# Patient Record
Sex: FEMALE | Race: BLACK OR AFRICAN AMERICAN | NOT HISPANIC OR LATINO
[De-identification: names, ages, dates, MRNs, and addresses within clinical notes are randomized per-mention and may not be internally consistent; named-entity substitution may affect disease eponyms.]

---

## 2019-07-25 PROBLEM — Z00.00 ENCOUNTER FOR PREVENTIVE HEALTH EXAMINATION: Status: ACTIVE | Noted: 2019-07-25

## 2019-08-09 ENCOUNTER — APPOINTMENT (OUTPATIENT)
Dept: ORTHOPEDIC SURGERY | Facility: CLINIC | Age: 69
End: 2019-08-09
Payer: MEDICARE

## 2019-08-09 DIAGNOSIS — Z86.79 PERSONAL HISTORY OF OTHER DISEASES OF THE CIRCULATORY SYSTEM: ICD-10-CM

## 2019-08-09 DIAGNOSIS — F17.200 NICOTINE DEPENDENCE, UNSPECIFIED, UNCOMPLICATED: ICD-10-CM

## 2019-08-09 DIAGNOSIS — Z87.39 PERSONAL HISTORY OF OTHER DISEASES OF THE MUSCULOSKELETAL SYSTEM AND CONNECTIVE TISSUE: ICD-10-CM

## 2019-08-09 DIAGNOSIS — M25.561 PAIN IN RIGHT KNEE: ICD-10-CM

## 2019-08-09 DIAGNOSIS — Z96.652 PRESENCE OF LEFT ARTIFICIAL KNEE JOINT: ICD-10-CM

## 2019-08-09 DIAGNOSIS — Z80.9 FAMILY HISTORY OF MALIGNANT NEOPLASM, UNSPECIFIED: ICD-10-CM

## 2019-08-09 DIAGNOSIS — Z86.39 PERSONAL HISTORY OF OTHER ENDOCRINE, NUTRITIONAL AND METABOLIC DISEASE: ICD-10-CM

## 2019-08-09 DIAGNOSIS — Z86.69 PERSONAL HISTORY OF OTHER DISEASES OF THE NERVOUS SYSTEM AND SENSE ORGANS: ICD-10-CM

## 2019-08-09 PROCEDURE — 99203 OFFICE O/P NEW LOW 30 MIN: CPT

## 2019-08-09 PROCEDURE — 73564 X-RAY EXAM KNEE 4 OR MORE: CPT | Mod: 50

## 2019-08-09 RX ORDER — SIMVASTATIN 20 MG/1
20 TABLET, FILM COATED ORAL
Refills: 0 | Status: ACTIVE | COMMUNITY

## 2019-08-09 RX ORDER — INSULIN GLARGINE 100 [IU]/ML
100 INJECTION, SOLUTION SUBCUTANEOUS
Refills: 0 | Status: ACTIVE | COMMUNITY

## 2019-08-09 RX ORDER — CAPTOPRIL 25 MG/1
25 TABLET ORAL
Refills: 0 | Status: ACTIVE | COMMUNITY

## 2019-08-09 RX ORDER — SIMVASTATIN 5 MG/1
5 TABLET, FILM COATED ORAL
Refills: 0 | Status: ACTIVE | COMMUNITY

## 2019-08-09 RX ORDER — HYDROCHLOROTHIAZIDE 50 MG/1
50 TABLET ORAL
Refills: 0 | Status: ACTIVE | COMMUNITY

## 2019-08-09 RX ORDER — IBUPROFEN 800 MG/1
800 TABLET, FILM COATED ORAL
Refills: 0 | Status: ACTIVE | COMMUNITY

## 2019-08-09 NOTE — HISTORY OF PRESENT ILLNESS
[Pain Location] : pain [] : right knee [Worsening] : worsening [10] : a maximum pain level of 10/10 [Walking] : walking [Standing] : standing [Constant] : ~He/She~ states the symptoms seem to be constant [de-identified] : 69 year old female s/p left total knee replacement in 2015 with another orthopedic surgeon presents to the office today complaining of right knee pain. She reports doing well with her left knee pain, though she admits she did not do much physical therapy and she is limited in her ROM. Pt denies any pain in her left knee. However, in regard to the right knee she reports pain that is achy and sharp at times. She denies any swelling, but does report buckling, clicking, and loss of motion. She reports having to ambulate with a walker. Her ambulation is extremely limited due to her severely arthritic spine and pain in her right knee. She denies ever negotiating stairs due to the right knee pain. She reports taking ibuprofen 800 mg for pain with only minimal relief. Pt also reports having an injection in her right knee a few years back with only minimal relief for about one week. PT is here today to discuss her surgical options for a right knee replacement.

## 2019-08-09 NOTE — REVIEW OF SYSTEMS
[Negative] : Heme/Lymph [Joint Stiffness] : joint stiffness [Joint Pain] : joint pain [FreeTextEntry9] : Right knee

## 2019-08-09 NOTE — ASSESSMENT
[FreeTextEntry1] : Pt is here for evaluation of her arthritic right knee which is giving her significant pain and disability. She has also been told by a spine surgeon that she would benefit from PT and epidural injections on her LS spine. She is in the middle of a cardiac workup. We would like to see her back after cardiac work up is completed, being that she is interested in having an elective RTK replacement. Being that her right hip exam was abnormal we would also like to do xray of the right hip upon her return. She has also been counseled on the benefits of weight loss and smoking cessation. F/u in 2-4 weeks.

## 2019-08-09 NOTE — PHYSICAL EXAM
[de-identified] : General appearance: well nourished and hydrated, pleasant, alert and oriented x 3, cooperative.\par Cardiovascular: no apparent abnormalities, no lower leg edema, no varicosities, pedal pulses are palpable.\par Neurologic: sensation is normal, no muscle weakness in upper or lower extremities\par Dermatologic no apparent skin lesions, moist, warm, no rash.\par Gait: nonantalgic.\par \par Right knee\par Inspection: no effusion or erythema.\par Wounds: none.\par Alignment: 20 degree flexion contracture \par Palpation: medial joint line tenderness\par ROM: 20-90 degrees\par Ligamentous laxity: all ligaments appear stable\par Meniscal Test: n/a\par Muscle Test: good quad strength.\par \par Left knee\par Inspection: no effusion\par Wounds: healed midline incision\par Alignment: normal.\par Palpation: no specific tenderness on palpation.\par ROM: 0-90 degrees \par Muscle Test: good quad strength.\par Leg examination: calf is soft and non-tender.\par \par Left hip\par Inspection: No swelling or ecchymosis.\par Wounds: none.\par Palpation: non-tender.\par Stability: no instability.\par Strength: 5/5 all motor groups.\par ROM: full painless ROM\par Leg length: equal.\par \par Right hip\par Inspection: No swelling or ecchymosis.\par Wounds: none.\par Palpation: non-tender.\par Stability: no instability.\par Strength: 5/5 all motor groups.\par ROM: flexion to 80 degrees, with restrictive internal and external rotation. \par Leg length: equal.\par  [de-identified] : Radiographs done today AP lateral and skyline views of both knees shows a well aligned cemented PS TKA on the left. The right knee has complete loss of medial joint space, sclerosis and osteophyte formation and severe varus deformity.

## 2020-03-05 ENCOUNTER — OUTPATIENT (OUTPATIENT)
Dept: OUTPATIENT SERVICES | Facility: HOSPITAL | Age: 70
LOS: 1 days | Discharge: HOME | End: 2020-03-05

## 2020-03-05 ENCOUNTER — APPOINTMENT (OUTPATIENT)
Dept: BURN CARE | Facility: CLINIC | Age: 70
End: 2020-03-05
Payer: MEDICARE

## 2020-03-05 DIAGNOSIS — T25.021A BURN OF UNSPECIFIED DEGREE OF RIGHT FOOT, INITIAL ENCOUNTER: ICD-10-CM

## 2020-03-05 PROCEDURE — 99202 OFFICE O/P NEW SF 15 MIN: CPT

## 2020-03-05 RX ORDER — SILVER SULFADIAZINE 10 MG/G
1 CREAM TOPICAL TWICE DAILY
Qty: 1 | Refills: 2 | Status: ACTIVE | COMMUNITY
Start: 2020-03-05 | End: 1900-01-01

## 2020-03-12 ENCOUNTER — OUTPATIENT (OUTPATIENT)
Dept: OUTPATIENT SERVICES | Facility: HOSPITAL | Age: 70
LOS: 1 days | Discharge: HOME | End: 2020-03-12

## 2020-03-12 ENCOUNTER — APPOINTMENT (OUTPATIENT)
Dept: BURN CARE | Facility: CLINIC | Age: 70
End: 2020-03-12
Payer: MEDICARE
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
## 2020-03-12 DIAGNOSIS — T31.0 BURNS INVOLVING LESS THAN 10% OF BODY SURFACE: ICD-10-CM
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
## 2020-03-12 DIAGNOSIS — T25.021A BURN OF UNSPECIFIED DEGREE OF RIGHT FOOT, INITIAL ENCOUNTER: ICD-10-CM

## 2020-03-12 PROCEDURE — 16025 DRESS/DEBRID P-THICK BURN M: CPT

## 2020-03-12 PROCEDURE — 99213 OFFICE O/P EST LOW 20 MIN: CPT | Mod: 25
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
## 2020-03-18 DIAGNOSIS — T25.021A BURN OF UNSPECIFIED DEGREE OF RIGHT FOOT, INITIAL ENCOUNTER: ICD-10-CM
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
## 2021-04-02 ENCOUNTER — APPOINTMENT (OUTPATIENT)
Dept: OPHTHALMOLOGY | Facility: CLINIC | Age: 71
End: 2021-04-02
Payer: MEDICARE

## 2021-04-02 ENCOUNTER — NON-APPOINTMENT (OUTPATIENT)
Age: 71
End: 2021-04-02

## 2021-04-02 PROCEDURE — 92002 INTRM OPH EXAM NEW PATIENT: CPT

## 2021-04-19 ENCOUNTER — NON-APPOINTMENT (OUTPATIENT)
Age: 71
End: 2021-04-19

## 2021-04-19 ENCOUNTER — APPOINTMENT (OUTPATIENT)
Dept: OPHTHALMOLOGY | Facility: CLINIC | Age: 71
End: 2021-04-19
Payer: MEDICARE

## 2021-04-19 PROCEDURE — 92012 INTRM OPH EXAM EST PATIENT: CPT | Mod: 25

## 2021-04-19 PROCEDURE — 68761 CLOSE TEAR DUCT OPENING: CPT | Mod: E2,E4

## 2021-08-16 ENCOUNTER — APPOINTMENT (OUTPATIENT)
Dept: OPHTHALMOLOGY | Facility: CLINIC | Age: 71
End: 2021-08-16
Payer: MEDICARE

## 2021-08-16 ENCOUNTER — NON-APPOINTMENT (OUTPATIENT)
Age: 71
End: 2021-08-16

## 2021-08-16 PROCEDURE — 92012 INTRM OPH EXAM EST PATIENT: CPT | Mod: 25

## 2021-08-16 PROCEDURE — 68761 CLOSE TEAR DUCT OPENING: CPT | Mod: E2,E4

## 2021-10-01 ENCOUNTER — APPOINTMENT (OUTPATIENT)
Dept: OPHTHALMOLOGY | Facility: CLINIC | Age: 71
End: 2021-10-01

## 2021-11-29 ENCOUNTER — APPOINTMENT (OUTPATIENT)
Dept: OPHTHALMOLOGY | Facility: CLINIC | Age: 71
End: 2021-11-29
Payer: MEDICARE

## 2021-11-29 ENCOUNTER — NON-APPOINTMENT (OUTPATIENT)
Age: 71
End: 2021-11-29

## 2021-11-29 PROCEDURE — 68761 CLOSE TEAR DUCT OPENING: CPT | Mod: E2,E4

## 2021-11-29 PROCEDURE — 92014 COMPRE OPH EXAM EST PT 1/>: CPT | Mod: 25

## 2021-11-29 PROCEDURE — 92134 CPTRZ OPH DX IMG PST SGM RTA: CPT

## 2022-02-08 ENCOUNTER — APPOINTMENT (OUTPATIENT)
Dept: NEUROSURGERY | Facility: CLINIC | Age: 72
End: 2022-02-08
Payer: MEDICARE

## 2022-02-08 DIAGNOSIS — Z86.19 PERSONAL HISTORY OF OTHER INFECTIOUS AND PARASITIC DISEASES: ICD-10-CM

## 2022-02-08 PROCEDURE — 99204 OFFICE O/P NEW MOD 45 MIN: CPT

## 2022-02-08 NOTE — HISTORY OF PRESENT ILLNESS
[de-identified] : Ms. THIBODEAUX has a history of chronic lower back pain which she was seen and evaluated by Dr. Vazquez in 2020. She now presents for evaluation of cervical spine symptoms. Unfortunately, she tripped and fell 2 weeks ago. She hit her head on a metal office chair. After the fall she felt a "stinging" / burning sensation in her arms. This lasted a few hours and has resolved. She denied acute LE symptoms after the fall. Since then she has been having some neck pain with restriction in ROM. She has chronic restriction in ROM of the left shoulder. She also has trigger fingers in her left hand. She sees Dr. Mead for this and is in the process of scheduling a release. She ambulates with a rollator walker. No bowel / bladder dysfunction.\par \par On exam today, she has restriction and weakness with left external rotation and abduction. Hand  are intact. + Left hand trigger finger. Reflexes are brisk. LE strength intact. Sensory intact. Hoffmans negative. She ambulates with a slight kyphosis, + rollator walker. \par \par I have reviewed an Xray [facility] 2019, [facility]. She is found to have diffuse cervical spondylosis with anterior osteophyte formation. She had a CT head after her fall on 1/11/22, [facility] which was negative for acute pathology, + small vessel ischemic change, and hypertrophy of the transverse ligament along the posterior margin of the dens. \par

## 2022-02-08 NOTE — ASSESSMENT
[FreeTextEntry1] : I am concerned that Ms. THIBODEAUX had a central cord injury after her fall. Although her UE symptoms have improved I would like an MRI and CT cervical spine for further evaluation. She is agreeable. Her PCP also recommended a neurologist consult for stroke evaluation. I have referred her to a general neurologist today. I will see her back once the MRI / CT are completed. All questions answered today.\par

## 2022-03-03 ENCOUNTER — APPOINTMENT (OUTPATIENT)
Dept: NEUROSURGERY | Facility: CLINIC | Age: 72
End: 2022-03-03
Payer: MEDICARE

## 2022-03-03 VITALS — BODY MASS INDEX: 44.2 KG/M2 | WEIGHT: 275 LBS | HEIGHT: 66 IN

## 2022-03-03 PROCEDURE — 99214 OFFICE O/P EST MOD 30 MIN: CPT

## 2022-03-08 NOTE — ASSESSMENT
[FreeTextEntry1] : We have had a thorough discussion regarding her current condition and findings. Ms. THIBODEAUX is aware of her MRI and CT scan findings. I would like her to see Dr. Hauser next week to discuss treatment options. She is agreeable and will bring her sister to that visit with her. All questions answered today.\par

## 2022-03-08 NOTE — HISTORY OF PRESENT ILLNESS
[FreeTextEntry1] :  Ms. THIBODEAUX has a history of chronic lower back pain which she was seen and evaluated by Dr. Vazquez in 2020. \par \par Unfortunately, she tripped and fell 6 weeks ago. She hit her head on a metal office chair. After the fall she felt a "stinging" / burning sensation in her arms. This lasted a few hours and has resolved. She denied acute LE symptoms after the fall. \par \par Although her acute symptoms have resolved since the fall, she states she is back to her baseline neck pain with restriction in ROM of the cervical spine and left shoulder. She ambulates with a rollator walker. No bowel / bladder dysfunction.\par \par On exam today, she has restriction and weakness with left external rotation and abduction. Hand  are intact. + Left hand trigger finger. Reflexes are brisk. LE strength intact. Sensory intact. Hoffmans negative. She ambulates with a slight kyphosis, + rollator walker. \par \par Today, we have reviewed an MRI and CT scan of the cervical spine from [facility], 2/2022. She is found to have diffuse cervical spondylosis with an osteophytic disc complex at C3/4 causing severe stenosis with cord signal change. + DISH anteriorly C2-C7. \par \par Upon further discussion she does report difficulty swallowing certain foods and medications. She did see an ENT last year and work up was negative. She recently had an EGD with her GI specialist who diagnosed her with GERD.

## 2022-03-14 ENCOUNTER — APPOINTMENT (OUTPATIENT)
Dept: NEUROSURGERY | Facility: CLINIC | Age: 72
End: 2022-03-14
Payer: MEDICARE

## 2022-03-14 PROCEDURE — 99213 OFFICE O/P EST LOW 20 MIN: CPT

## 2022-03-15 NOTE — HISTORY OF PRESENT ILLNESS
[FreeTextEntry1] : Patient returns to discuss cervical spine.  Briefly, severe DISH C2-5, canal stenosis at C3-4 from disc.  She has neck pain and paresthesias.  She reports difficulty swallowing. ambulates with a walker.  Feels her condition is getting worse, failed conservative management.\par \par She is obese, diabetic, although controlled.\par \par I believe she is a poor surgical candidate.  However, discussed with her that if surgery were entertained, I would consider C2-5 osteophyte removal, C3-4 ACDF, consider posterior cervical instrumentation and fusion.  She is high risk.  I encouraged her to hold off on surgery as long as possible.\par \par She will follow-up with cardiologist, pulminologist, and return in mid April.

## 2022-03-18 ENCOUNTER — NON-APPOINTMENT (OUTPATIENT)
Age: 72
End: 2022-03-18

## 2022-03-18 ENCOUNTER — APPOINTMENT (OUTPATIENT)
Dept: OPHTHALMOLOGY | Facility: CLINIC | Age: 72
End: 2022-03-18
Payer: MEDICARE

## 2022-03-18 PROCEDURE — 92012 INTRM OPH EXAM EST PATIENT: CPT | Mod: 25

## 2022-03-18 PROCEDURE — 68761 CLOSE TEAR DUCT OPENING: CPT | Mod: E2,E4,E3,E1

## 2022-04-13 ENCOUNTER — APPOINTMENT (OUTPATIENT)
Dept: NEUROSURGERY | Facility: CLINIC | Age: 72
End: 2022-04-13
Payer: MEDICARE

## 2022-04-13 VITALS — HEIGHT: 66 IN | WEIGHT: 275 LBS | BODY MASS INDEX: 44.2 KG/M2

## 2022-04-13 PROCEDURE — 99212 OFFICE O/P EST SF 10 MIN: CPT

## 2022-04-14 NOTE — HISTORY OF PRESENT ILLNESS
[FreeTextEntry1] : Briefly, Ms. Goncalves has severe DISH C2-5, canal stenosis at C3-4 from disc. \par \par Today she is here in follow up accompanied by her sister to discuss surgery. \eber Continues to have difficulty swallowing pills only.\par Has chronic neck pain, paresthesia in her fingers, and gait disturbances.\par \par She saw Dr. Reginald Birch, pulmonologist, diagnosis with borderline COPD, she has cut down from smoking a pack to 9 to 10 cigarettes a day, she is considering the patch.\par She is schedule to see Dr. Jason Osman, cardiologist, next week to discuss ECHO results.\par

## 2022-08-24 ENCOUNTER — APPOINTMENT (OUTPATIENT)
Dept: NEUROSURGERY | Facility: CLINIC | Age: 72
End: 2022-08-24

## 2022-08-24 VITALS — WEIGHT: 275 LBS | BODY MASS INDEX: 44.2 KG/M2 | HEIGHT: 66 IN

## 2022-08-24 PROCEDURE — 99212 OFFICE O/P EST SF 10 MIN: CPT

## 2022-08-24 NOTE — HISTORY OF PRESENT ILLNESS
[FreeTextEntry1] : CC:  72 year-old female with DISH presents today with progressive difficulty swallowing.  She reports no new pain or neurologic dysfunction.\par \par xrays shows potentially worsening of anterior osteophytes.\par \par neuro exam stable

## 2022-09-26 ENCOUNTER — OUTPATIENT (OUTPATIENT)
Dept: OUTPATIENT SERVICES | Facility: HOSPITAL | Age: 72
LOS: 1 days | Discharge: HOME | End: 2022-09-26

## 2022-09-26 ENCOUNTER — RESULT REVIEW (OUTPATIENT)
Age: 72
End: 2022-09-26

## 2022-09-26 DIAGNOSIS — M48.10 ANKYLOSING HYPEROSTOSIS [FORESTIER], SITE UNSPECIFIED: ICD-10-CM

## 2022-09-26 DIAGNOSIS — R13.10 DYSPHAGIA, UNSPECIFIED: ICD-10-CM

## 2022-09-26 PROCEDURE — 74230 X-RAY XM SWLNG FUNCJ C+: CPT | Mod: 26

## 2022-11-09 ENCOUNTER — APPOINTMENT (OUTPATIENT)
Dept: NEUROSURGERY | Facility: CLINIC | Age: 72
End: 2022-11-09

## 2022-11-09 VITALS — WEIGHT: 270 LBS | HEIGHT: 66 IN | BODY MASS INDEX: 43.39 KG/M2

## 2022-11-09 PROCEDURE — 99211 OFF/OP EST MAY X REQ PHY/QHP: CPT

## 2022-11-10 NOTE — HISTORY OF PRESENT ILLNESS
[FreeTextEntry1] : swallowing complaints stable\par cervical myelopathy stable\par recommend cervical xray in 6 months from last and follow-up\par patient high risk for surgery, will try to avoid

## 2022-12-19 ENCOUNTER — APPOINTMENT (OUTPATIENT)
Dept: OPHTHALMOLOGY | Facility: CLINIC | Age: 72
End: 2022-12-19

## 2022-12-19 ENCOUNTER — NON-APPOINTMENT (OUTPATIENT)
Age: 72
End: 2022-12-19

## 2022-12-19 PROCEDURE — 92014 COMPRE OPH EXAM EST PT 1/>: CPT | Mod: 25

## 2022-12-19 PROCEDURE — 68761 CLOSE TEAR DUCT OPENING: CPT | Mod: E2,E3,E4,E1

## 2023-01-19 ENCOUNTER — APPOINTMENT (OUTPATIENT)
Dept: NEUROSURGERY | Facility: CLINIC | Age: 73
End: 2023-01-19
Payer: MEDICARE

## 2023-01-19 PROCEDURE — 99441: CPT | Mod: 95

## 2023-01-20 NOTE — REASON FOR VISIT
[FreeTextEntry1] : complains of new numbness\par  diabetic\par cervical stenosis\par EMG\par f/u with PCP for blood work\par f/u in 4 weeks

## 2023-02-27 ENCOUNTER — APPOINTMENT (OUTPATIENT)
Dept: NEUROSURGERY | Facility: CLINIC | Age: 73
End: 2023-02-27

## 2023-04-17 ENCOUNTER — APPOINTMENT (OUTPATIENT)
Dept: OPHTHALMOLOGY | Facility: CLINIC | Age: 73
End: 2023-04-17
Payer: MEDICARE

## 2023-04-17 ENCOUNTER — NON-APPOINTMENT (OUTPATIENT)
Age: 73
End: 2023-04-17

## 2023-04-17 PROCEDURE — 92014 COMPRE OPH EXAM EST PT 1/>: CPT | Mod: 25

## 2023-04-17 PROCEDURE — 68761 CLOSE TEAR DUCT OPENING: CPT | Mod: E1,E2,E3,E4

## 2023-04-17 PROCEDURE — 92250 FUNDUS PHOTOGRAPHY W/I&R: CPT

## 2023-04-20 ENCOUNTER — APPOINTMENT (OUTPATIENT)
Dept: NEUROSURGERY | Facility: CLINIC | Age: 73
End: 2023-04-20
Payer: MEDICARE

## 2023-04-20 PROCEDURE — 99212 OFFICE O/P EST SF 10 MIN: CPT | Mod: 95

## 2023-04-22 NOTE — REASON FOR VISIT
[FreeTextEntry1] : bilateral hand pain and numbness\par EMG consistent with carpal tunnel syndrome\par wrist splints\par OT\par pain management for injections

## 2023-05-19 ENCOUNTER — NON-APPOINTMENT (OUTPATIENT)
Age: 73
End: 2023-05-19

## 2023-05-19 ENCOUNTER — APPOINTMENT (OUTPATIENT)
Dept: OPHTHALMOLOGY | Facility: CLINIC | Age: 73
End: 2023-05-19
Payer: MEDICARE

## 2023-05-19 PROCEDURE — 92014 COMPRE OPH EXAM EST PT 1/>: CPT

## 2023-05-19 PROCEDURE — 92250 FUNDUS PHOTOGRAPHY W/I&R: CPT

## 2023-05-24 ENCOUNTER — APPOINTMENT (OUTPATIENT)
Dept: PAIN MANAGEMENT | Facility: CLINIC | Age: 73
End: 2023-05-24
Payer: MEDICARE

## 2023-05-24 VITALS — BODY MASS INDEX: 44.36 KG/M2 | WEIGHT: 276 LBS | HEIGHT: 66 IN

## 2023-05-24 DIAGNOSIS — M17.11 UNILATERAL PRIMARY OSTEOARTHRITIS, RIGHT KNEE: ICD-10-CM

## 2023-05-24 PROCEDURE — 99204 OFFICE O/P NEW MOD 45 MIN: CPT

## 2023-05-24 RX ORDER — GABAPENTIN 600 MG/1
600 TABLET, COATED ORAL 3 TIMES DAILY
Qty: 90 | Refills: 0 | Status: ACTIVE | COMMUNITY

## 2023-05-25 NOTE — DATA REVIEWED
[FreeTextEntry1] : EMG report reviewed\par Axonal pathology affecting c6,7,8 nerve roots bilaterally\par carpal tunnel syndrome moderate on the right / severe on left\par

## 2023-05-25 NOTE — DISCUSSION/SUMMARY
[de-identified] : A discussion regarding available pain management treatment options occurred with the patient.  These included interventional, rehabilitative, pharmacological, and alternative modalities. We will proceed with the following:\par \par Interventional treatment options:\par -  Will proceed with a BL carpal tunnel injection under ultrasound \par \par Rehabilitative options:\par - initiate trial of physical therapy for right knee \par \par Medication based treatment options:\par - Ordered Meloxicam to take one a day for two weeks, patient was advised to stop taking Naproxen\par We decided to start an anti-inflammatory medication. We are starting Meloxicam 15mg. We have discussed the risks, benefits, and alternatives NSAID therapy including but not limited to the risk of bleeding, thrombosis, gastric mucosal irritation/ulceration, allergic reaction and kidney dysfunction; the patient verbalizes an understanding.\par \par - Ordered Gabapentin to take 3 x day \par Potential side effects were discussed which included dizziness, sedation, and pedal edema to name a few. If the patient cannot tolerate these side effects should they occur, then they will stop the medication. If the patient experiences sedation, they understand that they should not drive. The usage of the medication was discussed and the patient understands that it is an anti-epileptic medication used for neuropathic pain and that the pain relief from this medication will likely be subtle, and that hopefully in combination with the other treatments we are offering we will be able to get some additive relief.\par \par Complementary treatment options:\par - lifestyle modifications discussed\par \par JAMES Kinney attest that this documentation has been prepared under the direction and in the presence of provider Dr. Bautista Lomas. \par The documentation recorded by the scribe in my presence, accurately reflects the service I personally performed, and the decisions made by me with my edits as appropriate. \par \par Bautista Lomas, DO\par \par

## 2023-05-25 NOTE — PHYSICAL EXAM
[de-identified] : Lumbar Spine Exam:\par Inspection:\par erythema (-)\par ecchymosis (-)\par rashes (-)\par alignment: no scoliosis\par \par Palpation:\par Midline lumbar tenderness:             (-)\par paraspinal tenderness:                  L (-) ; R (-)\par sciatic nerve tenderness :             L (-) ; R (-)\par SI joint tenderness:                        L (-) ; R (-)\par GTB tenderness:                            L (-);  R (-)\par \par Limited ROM 2/2 to pain\par \par Strength:\par 5/5 throughout all muscle groups of the lower extremity\par \par                                    Right       Left   \par Hip Flexion:                (5/5)       (5/5)\par Quadriceps:               (5/5)       (5/5)\par Hamstrings:                (5/5)       (5/5)\par Ankle Dorsiflexion:     (5/5)       (5/5)\par EHL:                           (5/5)       (5/5)\par Ankle Plantarflexion:  (5/5)       (5/5)\par \par Special Tests:\par SLR:                           R (-) ; L (-)\par Facet loading:            R (-) ; L (-)\par CLAUDIA test:               R (-) ; L (-)\par \par Neurologic:\par Light touch intact throughout LE \par Reflexes normal and symmetric \par \par Gait:\par non- antalgic gait\par ambulates w/o assistive device\par

## 2023-05-25 NOTE — HISTORY OF PRESENT ILLNESS
[FreeTextEntry1] : HISTORY OF PRESENT ILLNESS: Mrs. Goncalves is a 73-year-old female with a chief complaint of bilateral wrist and hand pain. THe pain is associated with numbness/tingling in the thenar eminence along with the first four digits on both hands. EMG supports both a moderate to severe Carpal tunnel syndrome in addition to sub acute axonal pathology affecting C6,7,8 nerve roots on both sides.\par \par The patient has history for DISH / cervical cord syndrome. \eber Cohn also suffers with low back pain with radicular symptoms there as well. SHe reports having diffuse bilateral arm and leg pain associated with swelling. \par \par Today, she complains mainly of the bilateral hand pain and numbness, which affects her daily activities. SHe also complains of right knee pain that is very severe.\par \par As for her hand pain - strength is reduced and overall ability to carry out her normal daily functions is lessened. She reports pain that is aching/throbbing with intermittent periods of sharp stabbing pains. \par \par As for her right knee pain - pain is moderate when sitting, and severe when standing/walking. She saw an orthopedic surgeon - Dr. Wise who imaged the knee and reported severe OA, but did not offer surgery. We are requesting the imaging from his office. \par \par Pain is located at the medial and lateral knee joint and is sharp, stabbing with underlying throbbing pains. Pain is associated with swelling especially toward the end of the day. Nothing helps the pain despite trying otc meds, ice, heat and other conservative measures. Pain is 9/10.\par \par . low back pain with secondary pain in her left wrist and right leg pain. The patient reports this pain has gone on for years in her low back. The patient reports of history of arthritis and DISH disease. She reports 2 months ago, she woke up one morning and bilateral knees and ankles that is associated with swollen and achy. She states he right knee is worse, her left knee has been replaced. She obtained an EMG with Dr. Hauser due to bilateral numbness and tingling to hands and tips of fingers that has been going on for 4 years. She states she has weakness in hands and can’t hold on to any objections without falling out of hands.Patient describes the pain as severe. During the last month the pain has been constant with symptoms worsening in no typical pattern. Pain described as burning, sharp, cramping, dull aching, throbbing, shooting.  Pain is associated with numbness/pins and needles into the right and left upper and lower extremities. Patient has weakness in the right and left upper and lower extremities. Pain is increased with standing, walking, and exercise. Pain is decreased with lying down, sitting, and relaxation.  Pain is not changed with coughing sneezing and bowel movements. Bowel or bladder habits have not changed.\par \par She has managed this pain with Gabapentin 600mg 1 in the morning and 1 at night and she states she took Naproxen up to 4 x a day due to severe pain.\par \par ACTIVITIES: Patient could walk less than a block before the pain starts.  Patient has no pain when sitting.  Patient can stand for a couple minutes before pain starts.  The patient often lies down because of pain.  Patient uses cane and walker at this time.  Patient has difficulty performing household chores, doing yardwork or shopping, socializing with friends, participating in recreational activities or exercise at this time.\par Prior Pain Medications: Tylenol, naproxen, gabapentin \par

## 2023-06-07 ENCOUNTER — APPOINTMENT (OUTPATIENT)
Dept: PAIN MANAGEMENT | Facility: CLINIC | Age: 73
End: 2023-06-07
Payer: MEDICARE

## 2023-06-07 PROCEDURE — 20526 THER INJECTION CARP TUNNEL: CPT | Mod: 50

## 2023-06-11 NOTE — PROCEDURE
[FreeTextEntry3] : Date of Service: 06/07/2023 \par \par Account: [id number]\par \par Patient: BROOKLYNN THIBODEAUX \par \par YOB: 1950\par \par Age: 73 year\par \par Surgeon:      Bautista Lomas DO\par \par Assistant:    None\par \par Pre-Operative Diagnosis:         Carpal tunnel syndrome\par \par Post Operative Diagnosis:       Carpal tunnel syndrome \par \par Procedure:             carpal tunnel steroid injection \par \par Anesthesia:            none\par \par This procedure was carried out using ultrasound guidance.  The risks and benefits of the procedure were discussed extensively with the patient.  The consent of the patient was obtained and the following procedure was performed. The patient was placed in the prone position on the fluoroscopy table and the area was prepped and draped in a sterile fashion.  A timeout was performed with all essential staff present and the site and side were verified.\par \par The patient was placed in the sitting position with a sterile sheet under her hands.  The wrist area was prepped and draped in a sterile fashion.  Under ultrasound guidance, the flexor retinaculum and carpal tunnel was identified and marked.  Using sterile technique the superficial skin was anesthetized with 1% Lidocaine.  A 25 gauge Tuohy needle was advanced into the carpal tunnel, piercing the flexor retinaculum.  After negative aspiration for heme, 0.5cc (5mg decadron) was injected.  \par \par The exact same process was completed for the right hand. \par \par Disposition:\par \par      1. The patient was advised to F/U in 1-2 weeks to assess the response to the injection.\par      2. The patient was also instructed to contact me immediately if there were any concerns related to the procedure performed. 
Calm

## 2023-06-20 ENCOUNTER — APPOINTMENT (OUTPATIENT)
Dept: OPHTHALMOLOGY | Facility: CLINIC | Age: 73
End: 2023-06-20

## 2023-07-05 ENCOUNTER — APPOINTMENT (OUTPATIENT)
Dept: PAIN MANAGEMENT | Facility: CLINIC | Age: 73
End: 2023-07-05

## 2023-08-03 ENCOUNTER — APPOINTMENT (OUTPATIENT)
Dept: OPHTHALMOLOGY | Facility: CLINIC | Age: 73
End: 2023-08-03

## 2023-11-09 ENCOUNTER — APPOINTMENT (OUTPATIENT)
Dept: NEUROSURGERY | Facility: CLINIC | Age: 73
End: 2023-11-09
Payer: MEDICARE

## 2023-11-09 VITALS — WEIGHT: 276 LBS | HEIGHT: 66 IN | BODY MASS INDEX: 44.36 KG/M2

## 2023-11-09 PROCEDURE — 99211 OFF/OP EST MAY X REQ PHY/QHP: CPT

## 2023-11-10 ENCOUNTER — APPOINTMENT (OUTPATIENT)
Dept: OPHTHALMOLOGY | Facility: CLINIC | Age: 73
End: 2023-11-10

## 2023-12-14 ENCOUNTER — APPOINTMENT (OUTPATIENT)
Dept: NEUROSURGERY | Facility: CLINIC | Age: 73
End: 2023-12-14
Payer: MEDICARE

## 2023-12-14 VITALS — WEIGHT: 276 LBS | BODY MASS INDEX: 44.36 KG/M2 | HEIGHT: 66 IN

## 2023-12-14 DIAGNOSIS — S14.129D CENTRAL CORD SYNDROME AT UNSPECIFIED LVL OF CERVICAL SPINAL CORD, SUBSEQUENT ENCOUNTER: ICD-10-CM

## 2023-12-14 PROCEDURE — 99214 OFFICE O/P EST MOD 30 MIN: CPT

## 2023-12-14 NOTE — ASSESSMENT
[FreeTextEntry1] : This is a 74 yo F who presents for neurosurgical revisit with regards to severe central and neuroforaminal cervical spinal stenosis, most notable at C3-4 and C6-7. Patient exhibits correlating physical exam findings and we have discussed the possibility for neurosurgical intervention. Ultimately, she has a plethora of underlying comorbidities, including active tobacco use and DM, which raises her risk for surgical complication.  We have outlined that she can consider a SLIME X1 and she wishes to consult with Dr. Tucker. I will facilitate the appropriate consultation and with notable caution as to the volume of steroid injected considering her stenosis at the C6-7 segment.   She will also begin to adopt lifestyle modifications including cessation of tobacco use along with tight diabetic control.  I will revisit with her in 4 weeks and she is to contact me with any questions or concerns in the interim.  I personally reviewed with the PA, this patient's history and physical exam findings, as documented above. I have discussed the relevant areas of concern, having direct implications to the presenting problems and illnesses, and I have personally examined all pertinent and positive and negative findings, which impact the neurosurgical treatment plan.  COREY Park MD

## 2023-12-14 NOTE — HISTORY OF PRESENT ILLNESS
[FreeTextEntry1] : Ms. THIBODEAUX is a 74 yo F who presents for neurosurgical revisit with regards to persistent cervical radicular pain complaints. Symptoms have intensified over the previous month with a marked increase in both isolated neck pain along with radiating pain predominantly into the right upper extremity. Typically, she experiences numbness, tingling, shooting pain from the cervical region into the right upper extremity settling into the 1-2 digits of the right hand consistent with a C6/7 dermatomal distribution. She notes fine manipulation deficits and overall functional decline as of late.  Ambulation remains limited and she warrants a rolling walker at times for assistance.   [facility]- [facility]- study reveals evidence of severe DISH with advanced degenerative disease diffusely. Central and biforaminal stenosis noted to a severe degree at C3-4 and C6-7.  PHYSICAL EXAM:   Constitutional: Well appearing, no distress HEENT: Normocephalic Atraumatic Psychiatric: Alert and oriented to all spheres, normal mood Pulmonary: No respiratory distress  Neurologic:  CN II-XII grossly intact Palpation: (+) cervical paravertebral tenderness Strength:  strength, right, 4/5.  Sensation: Full sensation to light touch in all extremities Reflexes:   2+ biceps  2+ triceps   No Oneil's  No clonus  ROM markedly limited in all movements  Gait: unsteady, walking w/ assistance.

## 2024-01-10 ENCOUNTER — APPOINTMENT (OUTPATIENT)
Dept: PAIN MANAGEMENT | Facility: CLINIC | Age: 74
End: 2024-01-10
Payer: MEDICARE

## 2024-01-10 ENCOUNTER — APPOINTMENT (OUTPATIENT)
Dept: NEUROSURGERY | Facility: CLINIC | Age: 74
End: 2024-01-10

## 2024-01-10 VITALS — WEIGHT: 276 LBS | BODY MASS INDEX: 44.36 KG/M2 | HEIGHT: 66 IN

## 2024-01-10 DIAGNOSIS — M47.812 SPONDYLOSIS W/OUT MYELOPATHY OR RADICULOPATHY, CERVICAL REGION: ICD-10-CM

## 2024-01-10 PROCEDURE — 99215 OFFICE O/P EST HI 40 MIN: CPT

## 2024-01-10 NOTE — DATA REVIEWED
[FreeTextEntry1] : EMG report reviewed Axonal pathology affecting c6,7,8 nerve roots bilaterally carpal tunnel syndrome moderate on the right / severe on left  MRI of the cervical spine dated 12/6/2023 demonstrates multilevel degenerative changes in the cervical spine as outlined in the body of the report overall not significantly changed from prior MRI dated 2/13/2022.  Prominent retrodental pannus indents the ventral cord with moderate to severe spinal canal narrowing at craniocervical junction.  Severe spinal canal narrowing with flattening of the ventral cord at C3-4.  Moderate spinal canal narrowing at C6-7.  Mild spinal canal narrowing at C2-3, C4-5 and C5-6.  Multilevel high-grade foraminal narrowing as described, most severe at the left C5-6 level.  CT of the cervical spine dated 2/22/2022 demonstrates central/left paracentral disc/osteophyte complex at C3-4 which mildly flattens the ventral cord at the left of midline unchanged.  Mild bilateral neural foraminal narrowing is also reidentified at this level.  Moderate left neural foraminal narrowing again noted at C2-3.  Mild bilateral neural foraminal narrowing again seen at C4-5.  Mild disc bulging and marked left neural foraminal narrowing at C5-6, unchanged.  Diffuse disc bulging/ridging reidentified at C6-7 producing marked anterior thecal sac effacement without cord deformity.  Marked left and mild right neural foraminal narrowing is also again seen at this level.  UDS: No data obtained today  [place] [facility]: Checked.

## 2024-01-10 NOTE — ASSESSMENT
[FreeTextEntry1] : This is a 73-year-old female with complaints of neck pain with radicular features into the right upper extremity. The pain travels down into the hand and is associated with numbness/tingling. The pain is severe and makes it difficult for her to perform her ADLs.  Imaging studies as well as physical examination findings corroborate symptomatology. We will proceed with a SLIME x1 w/ mac and she will follow up afterwards. In the interim, she would like to trial physical therapy for symptom control. All this patients questions were answered and the conversation was understood well.  Patient had a MRI that shows a radicular component along with pain referred into the upper extremity. Patient has trialed rehab (Home exercise, physical therapy or chiropractic care) and medications. I will schedule a cervical epidural steroid 1-3 depending on effectiveness.  The patient has severe anxiety of procedures that necessitates monitored anesthesia care (MAC). The procedure performed will be close to major nerves, arteries, and spinal cord and/or joint structures. Due to the proximity of these structures, we need the patient to be still during the procedure. With the help of MAC, this will be safely achieved and decrease the risk of any complications.  RISK AND BENEFIT PARAGRAPH: Risk, benefits, pros and cons of procedure were explained to the patient using models and diagrams and their questions were answered.  I, Christa Link, attest that this documentation has been prepared under the direction and in the presence of Provider Loli Tucker MD.   Thank you for allowing me to assist in the management of this patient.    Best Regards,    Loli Tucker M.D., FAAPMR   Diplomate, American Board of Physical Medicine and Rehabilitation Diplomate, American Board of Pain Medicine  Diplomate, American Board of Pain Management

## 2024-01-10 NOTE — HISTORY OF PRESENT ILLNESS
[FreeTextEntry1] : ORIGINAL PRESENTATION:  Mrs. Goncalves is a 73-year-old female with a chief complaint of bilateral wrist and hand pain. The pain is associated with numbness/tingling in the thenar eminence along with the first four digits on both hands. EMG supports both a moderate to severe Carpal tunnel syndrome in addition to subacute axonal pathology affecting C6,7,8 nerve roots on both sides. The patient also has history for DISH / cervical cord syndrome. She also suffers with low back pain with radicular symptoms there as well. She reports having diffuse bilateral arm and leg pain associated with swelling.   As for her hand pain - strength is reduced and overall ability to carry out her normal daily functions is lessened. She reports pain that is aching/throbbing with intermittent periods of sharp stabbing pains.   As for her right knee pain - pain is moderate when sitting, and severe when standing/walking. She saw an orthopedic surgeon - Dr. Wise who imaged the knee and reported severe OA, but did not offer surgery. We are requesting the imaging from his office.   Pain is located at the medial and lateral knee joint and is sharp, stabbing with underlying throbbing pains. Pain is associated with swelling especially toward the end of the day. Nothing helps the pain despite trying otc meds, ice, heat and other conservative measures. Pain is 9/10.  Patient describes the pain as severe. During the last month the pain has been constant with symptoms worsening in no typical pattern. Pain described as burning, sharp, cramping, dull aching, throbbing, shooting.  Pain is associated with numbness/pins and needles into the right and left upper and lower extremities. Patient has weakness in the right and left upper and lower extremities. Pain is increased with standing, walking, and exercise. Pain is decreased with lying down, sitting, and relaxation.  Pain is not changed with coughing sneezing and bowel movements. Bowel or bladder habits have not changed.  She has managed this pain with Gabapentin 600mg 1 in the morning and 1 at night and she states she took Naproxen up to 4 x a day due to severe pain.  ACTIVITIES: Patient could walk less than a block before the pain starts.  Patient has no pain when sitting.  Patient can stand for a couple minutes before pain starts.  The patient often lies down because of pain.  Patient uses cane and walker at this time.  Patient has difficulty performing household chores, doing yardwork or shopping, socializing with friends, participating in recreational activities or exercise at this time. Prior Pain Medications: Tylenol, naproxen, gabapentin   PATIENT PRESENTS FOR FOLLOW UP: This is a former Dr. Bautista Lomas patient transferring her care to me. The patient has history for DISH / cervical cord syndrome. She is referred back by neurosurgery for cervical pain and radiculopathy and to trial injection therapy. She experiences neck pain with radicular features into the right upper extremity. The pain travels down into the hand. She notes there is numbness in the right hand which makes it difficult for her to perform her ADLs. Patient has not trialed physical therapy in the past. Imaging was reviewed with the patient and is documented below. The option to trial injection therapy was discussed and she is agreeable.

## 2024-01-10 NOTE — REASON FOR VISIT
[Follow-Up Visit] : a follow-up pain management visit [FreeTextEntry2] : Evaluation back and leg pain

## 2024-01-10 NOTE — PHYSICAL EXAM
[de-identified] : Lumbar Spine Exam:\par  Inspection:\par  erythema (-)\par  ecchymosis (-)\par  rashes (-)\par  alignment: no scoliosis\par  \par  Palpation:\par  Midline lumbar tenderness:             (-)\par  paraspinal tenderness:                  L (-) ; R (-)\par  sciatic nerve tenderness :             L (-) ; R (-)\par  SI joint tenderness:                        L (-) ; R (-)\par  GTB tenderness:                            L (-);  R (-)\par  \par  Limited ROM 2/2 to pain\par  \par  Strength:\par  5/5 throughout all muscle groups of the lower extremity\par  \par                                     Right       Left   \par  Hip Flexion:                (5/5)       (5/5)\par  Quadriceps:               (5/5)       (5/5)\par  Hamstrings:                (5/5)       (5/5)\par  Ankle Dorsiflexion:     (5/5)       (5/5)\par  EHL:                           (5/5)       (5/5)\par  Ankle Plantarflexion:  (5/5)       (5/5)\par  \par  Special Tests:\par  SLR:                           R (-) ; L (-)\par  Facet loading:            R (-) ; L (-)\par  CLAUDIA test:               R (-) ; L (-)\par  \par  Neurologic:\par  Light touch intact throughout LE \par  Reflexes normal and symmetric \par  \par  Gait:\par  non- antalgic gait\par  ambulates w/o assistive device\par

## 2024-01-31 ENCOUNTER — APPOINTMENT (OUTPATIENT)
Dept: NEUROSURGERY | Facility: CLINIC | Age: 74
End: 2024-01-31

## 2024-02-16 ENCOUNTER — APPOINTMENT (OUTPATIENT)
Dept: PAIN MANAGEMENT | Facility: CLINIC | Age: 74
End: 2024-02-16
Payer: MEDICARE

## 2024-02-16 ENCOUNTER — APPOINTMENT (OUTPATIENT)
Dept: PAIN MANAGEMENT | Facility: CLINIC | Age: 74
End: 2024-02-16

## 2024-02-16 ENCOUNTER — APPOINTMENT (OUTPATIENT)
Dept: OPHTHALMOLOGY | Facility: CLINIC | Age: 74
End: 2024-02-16

## 2024-02-16 DIAGNOSIS — M54.12 RADICULOPATHY, CERVICAL REGION: ICD-10-CM

## 2024-02-16 PROCEDURE — 93770 DETERMINATION VENOUS PRESS: CPT

## 2024-02-16 PROCEDURE — 94761 N-INVAS EAR/PLS OXIMETRY MLT: CPT

## 2024-02-16 PROCEDURE — 00600 ANES PX CRV SPINE&CORD NOS: CPT | Mod: QZ,P2

## 2024-02-16 PROCEDURE — 62321 NJX INTERLAMINAR CRV/THRC: CPT

## 2024-02-16 PROCEDURE — 93040 RHYTHM ECG WITH REPORT: CPT | Mod: 79

## 2024-02-16 NOTE — PROCEDURE
[FreeTextEntry3] :  CERVICAL EPIDURAL STEROID INJECTION UNDER FLUORSCOPY    Date:  2024  Patient: Aye Goncalves  :  1950  Preoperative Diagnosis: Cervical radiculopathy  Postoperative Diagnosis: Cervical radiculopathy  Procedure: Translaminar Cervical Epidural Injection under fluoroscopy  Physician: Loli Tucker MD, FAAPMR  Anesthesiologist/CRNA: Ms. King  Anesthesia: See nurses note. MAC/Cold spray. Versed 2 mg, fentanyl 50 mcg IVP     Medical Necessity:  Failure of conservative management.     CONSENT: The possible complications including infection, bleeding, nerve damage, hospital admission, death or failure of the procedure; though unusual, are theoretically possible. The patient was educated about the of the procedure and alternative therapies. All questions were answered and the patient freely gave consent to proceed.     Indication for Fluoroscopy:  This procedure requires the precise placement of the spinal needle.  It is the only way to accurately and safely perform the injection.     Monitoring:  Patient had continuous blood pressure, EKG, and pulse oximetry throughout the case. See nurse's notes.     After obtaining written consent, the After obtaining written consent, the patient was positioned prone on the fluoroscopy table. The back to her neck and upper thorax was prepped with Betadine and draped in usual sterile fashion. A time out was performed. The C7-T1 interspace was identified using fluoroscopy. The skin was infiltrated with lidocaine 2% -- 1 cc for subcutaneous analgesia.  The epidural space was identified using a 17g touhy needle with a midline approach using a loss of resistance technique. 2cc omnipaque was used to define the space. A solution of 5 ml of preservative-free sterile saline and 1ml of Methylprednisolone 80mg, 1cc was infused with minimal pressure on the syringe into the epidural space. The procedure was tolerated well. There was no evidence of CSF, Paresthesias nor heme. The needle was removed intact. A band aid was place on the site.     Epidurogram:  No signs of epidural fibrosis.     Complications: None. The patient tolerated the procedure well.     Disposition: I have examined the patient and there are no new physical findings since the original presentation.  Sensory and motor function were intact. The patient met discharge criteria see nurses notes. The discharge instruction sheet was reviewed and given to the patient. The patient was discharged home with a .  If patient gets sustained relief will have patient do shoulder griddle strengthening with Thera bands and walking.     Comment: 1st SLIME today, schedule 2nd in 1-2 weeks depending of effectiveness vs follow up in office depending on the insurance. Call if any problems.     This document was electronically signed by:     Loli Tucker MD, FAAPMR Diplomate, American Board of Physical Medicine and Rehabilitation Diplomate, American Board of Pain Medicine

## 2024-04-15 ENCOUNTER — APPOINTMENT (OUTPATIENT)
Dept: NEUROSURGERY | Facility: CLINIC | Age: 74
End: 2024-04-15
Payer: MEDICARE

## 2024-04-15 VITALS — HEIGHT: 66 IN | WEIGHT: 276 LBS | BODY MASS INDEX: 44.36 KG/M2

## 2024-04-15 DIAGNOSIS — G56.03 CARPAL TUNNEL SYNDROM,BILATERAL UPPER LIMBS: ICD-10-CM

## 2024-04-15 DIAGNOSIS — M48.10 ANKYLOSING HYPEROSTOSIS [FORESTIER], SITE UNSPECIFIED: ICD-10-CM

## 2024-04-15 DIAGNOSIS — M47.22 OTHER SPONDYLOSIS WITH RADICULOPATHY, CERVICAL REGION: ICD-10-CM

## 2024-04-15 PROCEDURE — 99213 OFFICE O/P EST LOW 20 MIN: CPT

## 2024-04-15 RX ORDER — GABAPENTIN 600 MG/1
600 TABLET, COATED ORAL EVERY 6 HOURS
Qty: 120 | Refills: 1 | Status: ACTIVE | COMMUNITY
Start: 2024-04-15 | End: 1900-01-01

## 2024-04-15 NOTE — ASSESSMENT
[FreeTextEntry1] : We have had a thorough discussion regarding her current condition, findings, and treatment options. Ms. THIBODEAUX presents with progressive neck pain with radiculopathy / myelopathy. She has known cervical spondylosis with DISH and associated stenosis. She has a number of underlying comorbidities including diabetes, possible COPD, and active tobacco use which raises her risks of surgical complications.  Prior to considering surgical intervention she would like to maximize conservative treatments.  We will increase her gabapentin and renew Mobic to take as needed.  We briefly discussed the option of a posterior cervical decompression however in the interim she will work on smoking cessation.  She would notify me of any issues changes or concerns.  I personally reviewed with the PA, this patient's history and physical exam findings, as documented above. I have discussed the relevant areas of concern, having direct implications to the presenting problems and illnesses, and I have personally examined all pertinent and positive and negative findings, which impact their neurosurgical treatment.  MS EMIR Foss-C Senior Physician Assistant [facility]    Chanel Hauser MD FAANS Chair, Department of Neurosurgery [facility]

## 2024-04-15 NOTE — HISTORY OF PRESENT ILLNESS
[FreeTextEntry1] : Ms. THIBODEAUX presents today for neurosurgical follow-up.  She continues to experience persistent neck pain with associated radiculopathy and numbness and paresthesias in her hands.  She has a known history of DISH /diffuse degenerative disc disease of the cervical spine with associated stenosis.  -MRI cervical spine from 12/6/2023, regional: severe DISH with advanced degenerative disease diffusely. Central and biforaminal stenosis noted to a severe degree at C3-4 and C6-7.  -EMG: Bilateral carpal tunnel syndrome moderate to severe on the left and moderate on the right along with underlying cervical radiculopathy.  Since her last office visit she had a SLIME with pain management (Dr. Tucker) initially she reported an increase in pain however the exacerbation has decreased. She feels her pain and fine motor deficits have progressed.   Of note she has a past medical history of diabetes, hypertension, possible COPD given her chronic smoking history.  She has been trying to decrease her cigarette use.  She continues to smoke at least 7 cigarettes a day.  PHYSICAL EXAM: Constitutional: Well appearing, no distress, ambulating with a rollator walker. HEENT: Normocephalic Atraumatic Psychiatric: Alert and oriented to all spheres, normal mood Pulmonary: No respiratory distress Neurologic: CN II-XII grossly intact Palpation: (+) cervical paravertebral tenderness Strength:  strength, right, 4/5. Sensation: Full sensation to light touch in all extremities Reflexes: 2+ biceps 2+ triceps No Oneil's No clonus +Tinels ROM markedly limited in all movements Gait: unsteady, walking w/ assistance.

## 2024-05-14 RX ORDER — MELOXICAM 15 MG/1
15 TABLET ORAL DAILY
Qty: 14 | Refills: 0 | Status: COMPLETED | COMMUNITY
Start: 2023-05-24 | End: 2024-05-28

## 2024-06-03 RX ORDER — MELOXICAM 15 MG/1
15 TABLET ORAL
Qty: 30 | Refills: 1 | Status: ACTIVE | COMMUNITY
Start: 2024-06-03 | End: 1900-01-01

## 2024-08-13 RX ORDER — GABAPENTIN 800 MG/1
800 TABLET, COATED ORAL 3 TIMES DAILY
Qty: 90 | Refills: 2 | Status: ACTIVE | COMMUNITY
Start: 2024-08-13 | End: 1900-01-01

## 2024-08-19 ENCOUNTER — APPOINTMENT (OUTPATIENT)
Dept: OPHTHALMOLOGY | Facility: CLINIC | Age: 74
End: 2024-08-19
Payer: MEDICARE

## 2024-08-19 ENCOUNTER — NON-APPOINTMENT (OUTPATIENT)
Age: 74
End: 2024-08-19

## 2024-08-19 PROCEDURE — 92012 INTRM OPH EXAM EST PATIENT: CPT | Mod: 25

## 2024-08-19 PROCEDURE — 68761 CLOSE TEAR DUCT OPENING: CPT | Mod: E1,E2,E3,E4

## 2024-09-11 ENCOUNTER — APPOINTMENT (OUTPATIENT)
Dept: NEUROSURGERY | Facility: CLINIC | Age: 74
End: 2024-09-11
Payer: MEDICARE

## 2024-09-11 VITALS — BODY MASS INDEX: 44.36 KG/M2 | WEIGHT: 276 LBS | HEIGHT: 66 IN

## 2024-09-11 DIAGNOSIS — M47.22 OTHER SPONDYLOSIS WITH RADICULOPATHY, CERVICAL REGION: ICD-10-CM

## 2024-09-11 DIAGNOSIS — S14.129D CENTRAL CORD SYNDROME AT UNSPECIFIED LVL OF CERVICAL SPINAL CORD, SUBSEQUENT ENCOUNTER: ICD-10-CM

## 2024-09-11 DIAGNOSIS — M47.812 SPONDYLOSIS W/OUT MYELOPATHY OR RADICULOPATHY, CERVICAL REGION: ICD-10-CM

## 2024-09-11 DIAGNOSIS — M54.12 RADICULOPATHY, CERVICAL REGION: ICD-10-CM

## 2024-09-11 DIAGNOSIS — M48.10 ANKYLOSING HYPEROSTOSIS [FORESTIER], SITE UNSPECIFIED: ICD-10-CM

## 2024-09-11 PROCEDURE — 99213 OFFICE O/P EST LOW 20 MIN: CPT

## 2024-09-11 NOTE — ASSESSMENT
[FreeTextEntry1] : This is a 75 yo F who presents for neurosurgical revisit with regards to cervical radiculopathy affecting the right upper extremity in the setting of extensive DISH/ multilevel spinal stenosis. She has extensive underlying comorbidities and continues to smoke cigarettes which makes her a high risk surgical candidate.  We have had a thorough discussion regarding the risks and benefits of surgery at this time and ultimately she wishes to exhaust all interventional treatments through pain management.  I have provided her with a referral to Dr. Tucker for additional considerations and she will return to our office as needed moving forward.  COREY Park MD

## 2024-09-11 NOTE — HISTORY OF PRESENT ILLNESS
[FreeTextEntry1] : This is a 74-year-old female who presents for neurosurgical revisit with regards to chronic pain involving the cervical region.  Symptoms have remained fairly stable over the past several months.  She has undergone 1 epidural with Dr. Tucker which provided excellent sustained benefit with regards to her right upper extremity radiculopathy.  This procedure was conducted approximately 7 months prior and over the past 1 to 2 months she has slowly began to experience a return of her radicular symptoms.  Patient with continued fine manipulation deficits.  She continues to use a rolling walker for assistance.  MR C spine- Regional- 12/6/2023- study reveals evidence of severe DISH with advanced degenerative disease diffusely. Central and biforaminal stenosis noted to a severe degree at C3-4 and C6-7.  EXAM: Neurologic: CN II-XII grossly intact Palpation: (+) cervical paravertebral tenderness Strength:  strength, right, 4/5. Sensation: Full sensation to light touch in all extremities Reflexes: 2+ biceps 2+ triceps  No Oneil's No clonus  ROM markedly limited in all movements  Gait: unsteady, walking w/ assistance.

## 2024-09-19 ENCOUNTER — APPOINTMENT (OUTPATIENT)
Dept: OPHTHALMOLOGY | Facility: CLINIC | Age: 74
End: 2024-09-19

## 2024-09-19 ENCOUNTER — NON-APPOINTMENT (OUTPATIENT)
Age: 74
End: 2024-09-19

## 2024-09-19 PROCEDURE — 92014 COMPRE OPH EXAM EST PT 1/>: CPT

## 2024-09-19 PROCEDURE — 92134 CPTRZ OPH DX IMG PST SGM RTA: CPT

## 2024-10-02 ENCOUNTER — APPOINTMENT (OUTPATIENT)
Dept: PAIN MANAGEMENT | Facility: CLINIC | Age: 74
End: 2024-10-02
Payer: MEDICARE

## 2024-10-02 DIAGNOSIS — M47.22 OTHER SPONDYLOSIS WITH RADICULOPATHY, CERVICAL REGION: ICD-10-CM

## 2024-10-02 DIAGNOSIS — M54.12 RADICULOPATHY, CERVICAL REGION: ICD-10-CM

## 2024-10-02 DIAGNOSIS — M47.812 SPONDYLOSIS W/OUT MYELOPATHY OR RADICULOPATHY, CERVICAL REGION: ICD-10-CM

## 2024-10-02 DIAGNOSIS — M25.561 PAIN IN RIGHT KNEE: ICD-10-CM

## 2024-10-02 DIAGNOSIS — M48.10 ANKYLOSING HYPEROSTOSIS [FORESTIER], SITE UNSPECIFIED: ICD-10-CM

## 2024-10-02 DIAGNOSIS — S14.129D CENTRAL CORD SYNDROME AT UNSPECIFIED LVL OF CERVICAL SPINAL CORD, SUBSEQUENT ENCOUNTER: ICD-10-CM

## 2024-10-02 PROCEDURE — 99215 OFFICE O/P EST HI 40 MIN: CPT

## 2024-10-02 NOTE — PHYSICAL EXAM
[de-identified] : EXAM: Neurologic: CN II-XII grossly intact Palpation: (+) cervical paravertebral tenderness Strength:  strength, right, 4/5. Sensation: Full sensation to light touch in all extremities Reflexes: 2+ biceps 2+ triceps  No Oneil's No clonus  ROM markedly limited in all movements  Gait: unsteady, walking w/ assistance.

## 2024-10-02 NOTE — HISTORY OF PRESENT ILLNESS
[FreeTextEntry1] : ORIGINAL PRESENTATION:  Mrs. Goncalves is a 74-year-old female with a chief complaint of bilateral wrist and hand pain. The pain is associated with numbness/tingling in the thenar eminence along with the first four digits on both hands. EMG supports both a moderate to severe Carpal tunnel syndrome in addition to subacute axonal pathology affecting C6,7,8 nerve roots on both sides. The patient also has history for DISH / cervical cord syndrome. She also suffers with low back pain with radicular symptoms there as well. She reports having diffuse bilateral arm and leg pain associated with swelling.   As for her hand pain - strength is reduced and overall ability to carry out her normal daily functions is lessened. She reports pain that is aching/throbbing with intermittent periods of sharp stabbing pains.   As for her right knee pain - pain is moderate when sitting, and severe when standing/walking. She saw an orthopedic surgeon - Dr. Wise who imaged the knee and reported severe OA, but did not offer surgery. We are requesting the imaging from his office.   Pain is located at the medial and lateral knee joint and is sharp, stabbing with underlying throbbing pains. Pain is associated with swelling especially toward the end of the day. Nothing helps the pain despite trying otc meds, ice, heat and other conservative measures. Pain is 9/10.  Patient describes the pain as severe. During the last month the pain has been constant with symptoms worsening in no typical pattern. Pain described as burning, sharp, cramping, dull aching, throbbing, shooting.  Pain is associated with numbness/pins and needles into the right and left upper and lower extremities. Patient has weakness in the right and left upper and lower extremities. Pain is increased with standing, walking, and exercise. Pain is decreased with lying down, sitting, and relaxation.  Pain is not changed with coughing sneezing and bowel movements. Bowel or bladder habits have not changed.  She has managed this pain with Gabapentin 600mg 1 in the morning and 1 at night and she states she took Naproxen up to 4 x a day due to severe pain.  ACTIVITIES: Patient could walk less than a block before the pain starts.  Patient has no pain when sitting.  Patient can stand for a couple minutes before pain starts.  The patient often lies down because of pain.  Patient uses cane and walker at this time.  Patient has difficulty performing household chores, doing yardwork or shopping, socializing with friends, participating in recreational activities or exercise at this time. Prior Pain Medications: Tylenol, naproxen, gabapentin   TODAY: Patient presents for a revisit appointment. The patient has history for DISH / cervical cord syndrome. She experiences neck pain with radicular features into the right upper extremity. She underwent a SLIME with MAC in February 2024 with >50% relief until a few weeks ago. She states she is back to having neck pain traveling down her right arm into the hand. She notes there is numbness in the right hand which makes it difficult for her to perform her ADLs. She was evaluated by neurosurgery and advised she is a high risk surgical candidate. She was referred back to our office for a repeat SLIME with MAC.  Of note, the patient has right knee pain due to severe OA and has been undergoing R knee steroid injections with Dr. Girard. Her last injection was 3 months ago. She wishes to undergo knee injections at our office in the future. Prior to doing so, she will undergo a x-ray of the right knee.

## 2024-10-02 NOTE — ASSESSMENT
[FreeTextEntry1] : This is a 74-year-old female with complaints of neck pain with radicular features into the right upper extremity. She underwent a SLIME with MAC in February 2024 with >50% relief until a few weeks ago. She states she is back to having neck pain traveling down her right arm into the hand. She notes there is numbness in the right hand. We will proceed with a SLIME x1 w/ mac and she will follow up afterwards. With regard to her right knee, she will undergo a right knee x-ray.  Patient had a MRI that shows a radicular component along with pain referred into the upper extremity. Patient has trialed rehab (Home exercise, physical therapy or chiropractic care) and medications. I will schedule a cervical epidural steroid 1-3 depending on effectiveness.  The patient has severe anxiety of procedures that necessitates monitored anesthesia care (MAC). The procedure performed will be close to major nerves, arteries, and spinal cord and/or joint structures. Due to the proximity of these structures, we need the patient to be still during the procedure. With the help of MAC, this will be safely achieved and decrease the risk of any complications.  Risk, benefits, pros and cons of procedure were explained to the patient using models and diagrams and their questions were answered.  COREY Easley MD

## 2024-10-07 ENCOUNTER — APPOINTMENT (OUTPATIENT)
Dept: OPHTHALMOLOGY | Facility: CLINIC | Age: 74
End: 2024-10-07
Payer: MEDICARE

## 2024-10-07 ENCOUNTER — NON-APPOINTMENT (OUTPATIENT)
Age: 74
End: 2024-10-07

## 2024-10-07 PROCEDURE — 92012 INTRM OPH EXAM EST PATIENT: CPT

## 2024-10-22 ENCOUNTER — APPOINTMENT (OUTPATIENT)
Facility: CLINIC | Age: 74
End: 2024-10-22

## 2024-10-22 ENCOUNTER — APPOINTMENT (OUTPATIENT)
Dept: PAIN MANAGEMENT | Facility: CLINIC | Age: 74
End: 2024-10-22
Payer: MEDICARE

## 2024-10-22 DIAGNOSIS — M54.12 RADICULOPATHY, CERVICAL REGION: ICD-10-CM

## 2024-10-22 PROCEDURE — 93040 RHYTHM ECG WITH REPORT: CPT | Mod: 79

## 2024-10-22 PROCEDURE — 94761 N-INVAS EAR/PLS OXIMETRY MLT: CPT

## 2024-10-22 PROCEDURE — 93770 DETERMINATION VENOUS PRESS: CPT

## 2024-10-22 PROCEDURE — 62321 NJX INTERLAMINAR CRV/THRC: CPT

## 2024-10-22 PROCEDURE — 00600 ANES PX CRV SPINE&CORD NOS: CPT | Mod: QZ,P3

## 2024-11-07 ENCOUNTER — APPOINTMENT (OUTPATIENT)
Dept: PAIN MANAGEMENT | Facility: CLINIC | Age: 74
End: 2024-11-07

## 2024-12-02 ENCOUNTER — RX RENEWAL (OUTPATIENT)
Age: 74
End: 2024-12-02

## 2025-01-06 ENCOUNTER — RX RENEWAL (OUTPATIENT)
Age: 75
End: 2025-01-06

## 2025-01-14 ENCOUNTER — APPOINTMENT (OUTPATIENT)
Dept: PAIN MANAGEMENT | Facility: CLINIC | Age: 75
End: 2025-01-14
Payer: MEDICARE

## 2025-01-14 DIAGNOSIS — M25.561 PAIN IN RIGHT KNEE: ICD-10-CM

## 2025-01-14 DIAGNOSIS — M47.812 SPONDYLOSIS W/OUT MYELOPATHY OR RADICULOPATHY, CERVICAL REGION: ICD-10-CM

## 2025-01-14 DIAGNOSIS — M54.12 RADICULOPATHY, CERVICAL REGION: ICD-10-CM

## 2025-01-14 PROCEDURE — 99214 OFFICE O/P EST MOD 30 MIN: CPT

## 2025-01-16 ENCOUNTER — APPOINTMENT (OUTPATIENT)
Dept: PAIN MANAGEMENT | Facility: CLINIC | Age: 75
End: 2025-01-16
Payer: MEDICARE

## 2025-01-16 DIAGNOSIS — M17.11 UNILATERAL PRIMARY OSTEOARTHRITIS, RIGHT KNEE: ICD-10-CM

## 2025-01-16 PROCEDURE — 20611 DRAIN/INJ JOINT/BURSA W/US: CPT | Mod: RT

## 2025-02-13 ENCOUNTER — APPOINTMENT (OUTPATIENT)
Dept: PAIN MANAGEMENT | Facility: CLINIC | Age: 75
End: 2025-02-13
Payer: MEDICARE

## 2025-02-13 DIAGNOSIS — S14.129D CENTRAL CORD SYNDROME AT UNSPECIFIED LVL OF CERVICAL SPINAL CORD, SUBSEQUENT ENCOUNTER: ICD-10-CM

## 2025-02-13 DIAGNOSIS — M54.12 RADICULOPATHY, CERVICAL REGION: ICD-10-CM

## 2025-02-13 DIAGNOSIS — M25.561 PAIN IN RIGHT KNEE: ICD-10-CM

## 2025-02-13 DIAGNOSIS — M17.11 UNILATERAL PRIMARY OSTEOARTHRITIS, RIGHT KNEE: ICD-10-CM

## 2025-02-13 DIAGNOSIS — M47.812 SPONDYLOSIS W/OUT MYELOPATHY OR RADICULOPATHY, CERVICAL REGION: ICD-10-CM

## 2025-02-13 DIAGNOSIS — M48.10 ANKYLOSING HYPEROSTOSIS [FORESTIER], SITE UNSPECIFIED: ICD-10-CM

## 2025-02-13 PROCEDURE — 99214 OFFICE O/P EST MOD 30 MIN: CPT

## 2025-05-09 ENCOUNTER — APPOINTMENT (OUTPATIENT)
Dept: PAIN MANAGEMENT | Facility: CLINIC | Age: 75
End: 2025-05-09
Payer: MEDICARE

## 2025-05-09 DIAGNOSIS — S14.129D CENTRAL CORD SYNDROME AT UNSPECIFIED LVL OF CERVICAL SPINAL CORD, SUBSEQUENT ENCOUNTER: ICD-10-CM

## 2025-05-09 DIAGNOSIS — M47.812 SPONDYLOSIS W/OUT MYELOPATHY OR RADICULOPATHY, CERVICAL REGION: ICD-10-CM

## 2025-05-09 DIAGNOSIS — R10.30 LOWER ABDOMINAL PAIN, UNSPECIFIED: ICD-10-CM

## 2025-05-09 DIAGNOSIS — M54.12 RADICULOPATHY, CERVICAL REGION: ICD-10-CM

## 2025-05-09 DIAGNOSIS — M25.561 PAIN IN RIGHT KNEE: ICD-10-CM

## 2025-05-09 DIAGNOSIS — M48.10 ANKYLOSING HYPEROSTOSIS [FORESTIER], SITE UNSPECIFIED: ICD-10-CM

## 2025-05-09 PROCEDURE — 99214 OFFICE O/P EST MOD 30 MIN: CPT

## 2025-05-09 RX ORDER — DIAZEPAM 5 MG/1
5 TABLET ORAL
Qty: 2 | Refills: 0 | Status: ACTIVE | COMMUNITY
Start: 2025-05-09 | End: 1900-01-01

## 2025-05-14 ENCOUNTER — APPOINTMENT (OUTPATIENT)
Dept: NEUROSURGERY | Facility: CLINIC | Age: 75
End: 2025-05-14

## 2025-05-16 ENCOUNTER — APPOINTMENT (OUTPATIENT)
Dept: PAIN MANAGEMENT | Facility: CLINIC | Age: 75
End: 2025-05-16

## 2025-05-23 ENCOUNTER — APPOINTMENT (OUTPATIENT)
Dept: PAIN MANAGEMENT | Facility: CLINIC | Age: 75
End: 2025-05-23
Payer: MEDICARE

## 2025-05-23 DIAGNOSIS — M54.12 RADICULOPATHY, CERVICAL REGION: ICD-10-CM

## 2025-05-23 PROCEDURE — 93770 DETERMINATION VENOUS PRESS: CPT | Mod: 59

## 2025-05-23 PROCEDURE — 93040 RHYTHM ECG WITH REPORT: CPT | Mod: 79

## 2025-05-23 PROCEDURE — 62321 NJX INTERLAMINAR CRV/THRC: CPT

## 2025-05-23 PROCEDURE — 94761 N-INVAS EAR/PLS OXIMETRY MLT: CPT | Mod: 59

## 2025-06-12 ENCOUNTER — APPOINTMENT (OUTPATIENT)
Dept: PAIN MANAGEMENT | Facility: CLINIC | Age: 75
End: 2025-06-12

## 2025-07-23 ENCOUNTER — APPOINTMENT (OUTPATIENT)
Dept: PAIN MANAGEMENT | Facility: CLINIC | Age: 75
End: 2025-07-23
Payer: MEDICARE

## 2025-07-23 DIAGNOSIS — M25.561 PAIN IN RIGHT KNEE: ICD-10-CM

## 2025-07-23 DIAGNOSIS — M54.12 RADICULOPATHY, CERVICAL REGION: ICD-10-CM

## 2025-07-23 DIAGNOSIS — R10.30 LOWER ABDOMINAL PAIN, UNSPECIFIED: ICD-10-CM

## 2025-07-23 DIAGNOSIS — M47.812 SPONDYLOSIS W/OUT MYELOPATHY OR RADICULOPATHY, CERVICAL REGION: ICD-10-CM

## 2025-07-23 DIAGNOSIS — S14.129D CENTRAL CORD SYNDROME AT UNSPECIFIED LVL OF CERVICAL SPINAL CORD, SUBSEQUENT ENCOUNTER: ICD-10-CM

## 2025-07-23 DIAGNOSIS — M48.10 ANKYLOSING HYPEROSTOSIS [FORESTIER], SITE UNSPECIFIED: ICD-10-CM

## 2025-07-23 PROCEDURE — 99214 OFFICE O/P EST MOD 30 MIN: CPT

## 2025-08-14 ENCOUNTER — APPOINTMENT (OUTPATIENT)
Dept: PAIN MANAGEMENT | Facility: CLINIC | Age: 75
End: 2025-08-14

## 2025-08-14 DIAGNOSIS — M17.11 UNILATERAL PRIMARY OSTEOARTHRITIS, RIGHT KNEE: ICD-10-CM

## 2025-08-14 PROCEDURE — 20611 DRAIN/INJ JOINT/BURSA W/US: CPT | Mod: RT

## 2025-08-14 PROCEDURE — 77002 NEEDLE LOCALIZATION BY XRAY: CPT

## 2025-08-19 ENCOUNTER — APPOINTMENT (OUTPATIENT)
Dept: NEUROSURGERY | Facility: CLINIC | Age: 75
End: 2025-08-19

## 2025-09-11 ENCOUNTER — APPOINTMENT (OUTPATIENT)
Dept: PAIN MANAGEMENT | Facility: CLINIC | Age: 75
End: 2025-09-11